# Patient Record
Sex: MALE | Race: WHITE | Employment: FULL TIME | URBAN - NONMETROPOLITAN AREA
[De-identification: names, ages, dates, MRNs, and addresses within clinical notes are randomized per-mention and may not be internally consistent; named-entity substitution may affect disease eponyms.]

---

## 2017-02-16 PROBLEM — K50.00 CROHN'S DISEASE OF SMALL INTESTINE (HCC): Status: ACTIVE | Noted: 2017-02-16

## 2017-02-16 PROBLEM — I49.3 PVC'S (PREMATURE VENTRICULAR CONTRACTIONS): Status: ACTIVE | Noted: 2017-02-16

## 2018-07-03 ENCOUNTER — HOSPITAL ENCOUNTER (EMERGENCY)
Age: 48
Discharge: HOME | End: 2018-07-03
Payer: COMMERCIAL

## 2018-07-03 VITALS
HEART RATE: 68 BPM | SYSTOLIC BLOOD PRESSURE: 118 MMHG | WEIGHT: 195 LBS | DIASTOLIC BLOOD PRESSURE: 58 MMHG | TEMPERATURE: 97.9 F | BODY MASS INDEX: 26.45 KG/M2 | RESPIRATION RATE: 16 BRPM | OXYGEN SATURATION: 97 %

## 2018-07-03 DIAGNOSIS — L25.5 ALLERGIC DERMATITIS DUE TO POISON VINE: Primary | ICD-10-CM

## 2018-07-03 PROCEDURE — 99213 OFFICE O/P EST LOW 20 MIN: CPT | Performed by: NURSE PRACTITIONER

## 2018-07-03 PROCEDURE — 96372 THER/PROPH/DIAG INJ SC/IM: CPT

## 2018-07-03 PROCEDURE — 99212 OFFICE O/P EST SF 10 MIN: CPT

## 2018-07-03 PROCEDURE — 6360000002 HC RX W HCPCS: Performed by: NURSE PRACTITIONER

## 2018-07-03 RX ORDER — METHYLPREDNISOLONE ACETATE 80 MG/ML
80 INJECTION, SUSPENSION INTRA-ARTICULAR; INTRALESIONAL; INTRAMUSCULAR; SOFT TISSUE ONCE
Status: COMPLETED | OUTPATIENT
Start: 2018-07-03 | End: 2018-07-03

## 2018-07-03 RX ORDER — PREDNISONE 10 MG/1
10 TABLET ORAL SEE ADMIN INSTRUCTIONS
Qty: 21 TABLET | Refills: 0 | Status: SHIPPED | OUTPATIENT
Start: 2018-07-03 | End: 2018-07-09

## 2018-07-03 RX ADMIN — METHYLPREDNISOLONE ACETATE 80 MG: 80 INJECTION, SUSPENSION INTRA-ARTICULAR; INTRALESIONAL; INTRAMUSCULAR; SOFT TISSUE at 11:08

## 2018-07-03 ASSESSMENT — ENCOUNTER SYMPTOMS
SHORTNESS OF BREATH: 0
PERI-ORBITAL EDEMA: 0
WHEEZING: 0
VOMITING: 0
COUGH: 0
RHINORRHEA: 0
NAUSEA: 0
SORE THROAT: 0
APNEA: 0
CHEST TIGHTNESS: 0
ABDOMINAL PAIN: 0
HOARSE VOICE: 0
STRIDOR: 0
CHOKING: 0
DIARRHEA: 0
THROAT SWELLING: 0

## 2018-09-07 ENCOUNTER — HOSPITAL ENCOUNTER (EMERGENCY)
Age: 48
Discharge: HOME | End: 2018-09-07
Attending: EMERGENCY MEDICINE
Payer: COMMERCIAL

## 2018-09-07 VITALS
RESPIRATION RATE: 16 BRPM | OXYGEN SATURATION: 96 % | DIASTOLIC BLOOD PRESSURE: 70 MMHG | TEMPERATURE: 97.8 F | WEIGHT: 200 LBS | HEART RATE: 64 BPM | BODY MASS INDEX: 27.12 KG/M2 | SYSTOLIC BLOOD PRESSURE: 114 MMHG

## 2018-09-07 DIAGNOSIS — J01.00 ACUTE MAXILLARY SINUSITIS, RECURRENCE NOT SPECIFIED: Primary | ICD-10-CM

## 2018-09-07 DIAGNOSIS — J20.9 ACUTE BRONCHITIS DUE TO INFECTION: ICD-10-CM

## 2018-09-07 PROCEDURE — 99212 OFFICE O/P EST SF 10 MIN: CPT

## 2018-09-07 PROCEDURE — 99213 OFFICE O/P EST LOW 20 MIN: CPT | Performed by: EMERGENCY MEDICINE

## 2018-09-07 RX ORDER — PROMETHAZINE HYDROCHLORIDE AND CODEINE PHOSPHATE 6.25; 1 MG/5ML; MG/5ML
5 SYRUP ORAL 4 TIMES DAILY PRN
Qty: 120 ML | Refills: 0 | Status: SHIPPED | OUTPATIENT
Start: 2018-09-07 | End: 2018-09-12

## 2018-09-07 RX ORDER — DOXYCYCLINE HYCLATE 100 MG
100 TABLET ORAL 2 TIMES DAILY
Qty: 14 TABLET | Refills: 0 | Status: SHIPPED | OUTPATIENT
Start: 2018-09-07 | End: 2018-09-14

## 2018-09-07 ASSESSMENT — ENCOUNTER SYMPTOMS
EYE DISCHARGE: 0
VOMITING: 0
SORE THROAT: 0
VOICE CHANGE: 1
STRIDOR: 0
SINUS PRESSURE: 1
WHEEZING: 0
EYE REDNESS: 0
TROUBLE SWALLOWING: 0
RHINORRHEA: 1
SINUS PAIN: 1
DIARRHEA: 0
SHORTNESS OF BREATH: 0
COUGH: 1
BACK PAIN: 0
NAUSEA: 0
ABDOMINAL PAIN: 0
EYE PAIN: 0

## 2018-09-07 NOTE — ED PROVIDER NOTES
past surgical history that includes Small intestine surgery. CURRENT MEDICATIONS       Previous Medications    INFLIXIMAB (REMICADE) 100 MG INJECTION    Infuse 5 mg/kg intravenously See Admin Instructions Every 6 weeks    METOPROLOL SUCCINATE (TOPROL XL) 25 MG EXTENDED RELEASE TABLET    Take 25 mg by mouth daily    NITROGLYCERIN (NITROSTAT) 0.4 MG SL TABLET    up to max of 3 total doses. If no relief after 1 dose, call 911. ALLERGIES     Patient is is allergic to shellfish-derived products. FAMILY HISTORY     Patient's family history includes No Known Problems in his mother. SOCIAL HISTORY     Patient  reports that he has never smoked. He has never used smokeless tobacco. He reports that he drinks alcohol. He reports that he does not use drugs. PHYSICAL EXAM     ED TRIAGE VITALS  BP: 114/70, Temp: 97.8 °F (36.6 °C), Pulse: 64, Resp: 16, SpO2: 96 %  Physical Exam   Constitutional: He is oriented to person, place, and time. He appears well-developed and well-nourished. No distress. Nasal voice, dry cough, moist membranes, normal airway   HENT:   Head: Normocephalic and atraumatic. Right Ear: Tympanic membrane and external ear normal.   Left Ear: Tympanic membrane and external ear normal.   Nose: No rhinorrhea. Right sinus exhibits maxillary sinus tenderness. Right sinus exhibits no frontal sinus tenderness. Left sinus exhibits maxillary sinus tenderness. Left sinus exhibits no frontal sinus tenderness. Mouth/Throat: Oropharynx is clear and moist. No trismus in the jaw. No uvula swelling. No oropharyngeal exudate, posterior oropharyngeal edema, posterior oropharyngeal erythema or tonsillar abscesses. Eyes: Pupils are equal, round, and reactive to light. Conjunctivae and EOM are normal. Right eye exhibits no discharge. Left eye exhibits no discharge. No scleral icterus. Neck: Normal range of motion. No JVD present. No thyromegaly present.    No meningismus   Cardiovascular: Normal rate, regular rhythm, S1 normal, S2 normal, normal heart sounds, intact distal pulses and normal pulses. Exam reveals no gallop and no friction rub. No murmur heard. Pulmonary/Chest: Effort normal. No stridor. No tachypnea. No respiratory distress. He has no decreased breath sounds. He has no wheezes. He has rhonchi. He has no rales. He exhibits no tenderness. Dry cough, diffuse rhonchi, no wheezing, no stridor   Abdominal: Soft. Bowel sounds are normal. He exhibits no distension and no mass. There is no tenderness. There is no rebound and no guarding. Soft nontender   Musculoskeletal: Normal range of motion. He exhibits no edema or tenderness. Right lower leg: Normal.        Left lower leg: Normal.   Lymphadenopathy:     He has cervical adenopathy. Right cervical: Superficial cervical adenopathy present. No deep cervical adenopathy present. Left cervical: Superficial cervical adenopathy present. No deep cervical adenopathy present. Neurological: He is alert and oriented to person, place, and time. He has normal reflexes. No cranial nerve deficit. He exhibits normal muscle tone. Coordination normal.   Appropriate, no focal findings   Skin: Skin is warm and dry. No rash noted. He is not diaphoretic. No erythema. No rash   Psychiatric: He has a normal mood and affect. His behavior is normal. Judgment and thought content normal.   Nursing note and vitals reviewed. DIAGNOSTIC RESULTS   Labs:No results found for this visit on 09/07/18. IMAGING:  No orders to display     URGENT CARE COURSE:     Vitals:    09/07/18 1902   BP: 114/70   Pulse: 64   Resp: 16   Temp: 97.8 °F (36.6 °C)   TempSrc: Oral   SpO2: 96%   Weight: 200 lb (90.7 kg)       Medications - No data to display  PROCEDURES:  None  FINAL IMPRESSION      1. Acute maxillary sinusitis, recurrence not specified    2.  Acute bronchitis due to infection        DISPOSITION/PLAN   DISPOSITION Decision To Discharge 09/07/2018 07:34:54 PM nontoxic, well-hydrated, normal airway. No airway abscess or epiglottitis, sepsis, CNS infection, pneumonia, hypoxia, bronchospasm. No ACS, CHF, PE. Patient has acute sinusitis and acute bronchitis. Will treat with doxycycline, Coricidin HBP.,  Phenergan with codeine, Tylenol, increased oral clear liquids, rest in cool air-conditioned space. Patient to recheck with PCP in 5 days if problems persist, and understands to go to ED if worse. PATIENT REFERRED TO:  Smith Anne MD  [address] [place] [address]    Schedule an appointment as soon as possible for a visit in 5 days  Recheck if problems persist, go to emergency if worse    DISCHARGE MEDICATIONS:  New Prescriptions    DEXTROMETHORPHAN-GUAIFENESIN (CORICIDIN HBP CONGESTION/COUGH)  MG CAPS    Take 1 tablet by mouth 4 times daily as needed (Sinus pressure, postnasal drainage, congestion, cough)    DOXYCYCLINE HYCLATE (VIBRA-TABS) 100 MG TABLET    Take 1 tablet by mouth 2 times daily for 7 days    PROMETHAZINE-CODEINE (PHENERGAN WITH CODEINE) 6.25-10 MG/5ML SYRUP    Take 5 mLs by mouth 4 times daily as needed for Cough (Multivitamins by mouth 4 times daily for cough) for up to 5 days. Caution not at work will cause drowsiness.      Current Discharge Medication List          MD Manuel Joaquin MD  09/07/18 7690

## 2021-10-14 NOTE — ED PROVIDER NOTES
Caller: MELITON         Best call back number: [phone]      What was the call regarding:[facility] CALLED AND STATED THEY DID RECEIVE THE REFERRAL FOR HOME HEALTH, BUT AT PATIENTS REQUEST THEY ARE STARTING SERVICES ON Monday 10/18        and headaches. PAST MEDICAL HISTORY         Diagnosis Date    Crohn's disease [facility])        SURGICAL HISTORY     Patient  has a past surgical history that includes Small intestine surgery. CURRENT MEDICATIONS       Previous Medications    INFLIXIMAB (REMICADE) 100 MG INJECTION    Infuse 5 mg/kg intravenously See Admin Instructions Every 6 weeks    METOPROLOL SUCCINATE (TOPROL XL) 25 MG EXTENDED RELEASE TABLET    Take 25 mg by mouth daily    NITROGLYCERIN (NITROSTAT) 0.4 MG SL TABLET    up to max of 3 total doses. If no relief after 1 dose, call 911.    TESTOSTERONE (AXIRON) 30 MG/ACT SOLN    Place  onto the skin. ALLERGIES     Patient is is allergic to shellfish-derived products. FAMILY HISTORY     Patient's family history is not on file. SOCIAL HISTORY     Patient  reports that he has never smoked. He has never used smokeless tobacco. He reports that he drinks alcohol. He reports that he does not use drugs. PHYSICAL EXAM     ED TRIAGE VITALS  BP: 123/87, Temp: 97.9 °F (36.6 °C), Pulse: 72, Resp: 16, SpO2: 97 %  Physical Exam   Constitutional: He is oriented to person, place, and time. He appears well-developed and well-nourished. No distress. HENT:   Head: Normocephalic and atraumatic. Eyes: Conjunctivae and EOM are normal.   Neck: Normal range of motion. Cardiovascular: Intact distal pulses. Pulmonary/Chest: Effort normal.   Musculoskeletal: Normal range of motion. Neurological: He is alert and oriented to person, place, and time. Skin: Skin is warm and dry. Rash noted. Rash is urticarial. He is not diaphoretic. No cyanosis. Nails show no clubbing. Psychiatric: He has a normal mood and affect. His behavior is normal. Judgment and thought content normal.   Nursing note and vitals reviewed. DIAGNOSTIC RESULTS   Labs:No results found for this visit on 07/03/18.     IMAGING:  No orders to display     URGENT CARE COURSE:     Vitals:    07/03/18 1048   BP: 123/87   Pulse: 72   Resp: 16   Temp: 97.9 °F (36.6 °C)   TempSrc: Oral   SpO2: 97%   Weight: 195 lb (88.5 kg)       Medications   methylPREDNISolone acetate (DEPO-MEDROL) injection 80 mg (80 mg Intramuscular Given 7/3/18 1107)     PROCEDURES:  None  FINAL IMPRESSION      1. Allergic dermatitis due to poison vine        DISPOSITION/PLAN   Decision To Discharge     Take Medication as Directed  Benadryl for itch, Don't scratch  Monitor for any increase in size or spreading  Monitor for fever or chills  Keep drainage covered if any. Follow up with your PCP or return as needed  Or go to the emergency Department. PATIENT REFERRED TO:  Brandi Emerson MD  [address] [place] [address]    Schedule an appointment as soon as possible for a visit in 1 week  For wound re-check    DISCHARGE MEDICATIONS:  New Prescriptions    HOMEOPATHIC PRODUCTS (TECNU RASH REL MED ANTI-ITCH) SOLN    Follow package directions for topical use    PREDNISONE (DELTASONE) 10 MG TABLET    Take 1 tablet by mouth See Admin Instructions for 6 days 60 mgs p.o x 1 day. 50 mgs p.o x 1 day. 40 mgs p.o x 1 day. 30 mgs p.o x 1 day. 20 mgs p.o x 1 day. 10 mgs p.o x 1 day.      Current Discharge Medication List          LUIS Salas - CNP          LUIS Salas - [facility]  07/03/18 7028

## 2025-04-17 ENCOUNTER — LAB (OUTPATIENT)
Dept: LAB | Age: 55
End: 2025-04-17

## 2025-04-17 LAB
25(OH)D3 SERPL-MCNC: 42 NG/ML (ref 30–100)
ALBUMIN SERPL BCG-MCNC: 4.2 G/DL (ref 3.4–4.9)
ALP SERPL-CCNC: 60 U/L (ref 40–129)
ALT SERPL W/O P-5'-P-CCNC: 23 U/L (ref 10–50)
ANION GAP SERPL CALC-SCNC: 10 MEQ/L (ref 8–16)
AST SERPL-CCNC: 24 U/L (ref 10–50)
BASOPHILS ABSOLUTE: 0 THOU/MM3 (ref 0–0.1)
BASOPHILS NFR BLD AUTO: 0.5 %
BILIRUB SERPL-MCNC: 0.9 MG/DL (ref 0.3–1.2)
BUN SERPL-MCNC: 18 MG/DL (ref 8–23)
CALCIUM SERPL-MCNC: 9.5 MG/DL (ref 8.6–10)
CHLORIDE SERPL-SCNC: 103 MEQ/L (ref 98–111)
CHOLEST SERPL-MCNC: 168 MG/DL (ref 100–199)
CO2 SERPL-SCNC: 24 MEQ/L (ref 22–29)
CREAT SERPL-MCNC: 1.1 MG/DL (ref 0.7–1.2)
DEPRECATED RDW RBC AUTO: 40.1 FL (ref 35–45)
EOSINOPHIL NFR BLD AUTO: 5.3 %
EOSINOPHILS ABSOLUTE: 0.4 THOU/MM3 (ref 0–0.4)
ERYTHROCYTE [DISTWIDTH] IN BLOOD BY AUTOMATED COUNT: 12.2 % (ref 11.5–14.5)
GFR SERPL CREATININE-BSD FRML MDRD: 79 ML/MIN/1.73M2
GLUCOSE SERPL-MCNC: 93 MG/DL (ref 74–109)
HCT VFR BLD AUTO: 43.6 % (ref 42–52)
HDLC SERPL-MCNC: 54 MG/DL
HGB BLD-MCNC: 14.9 GM/DL (ref 14–18)
IMM GRANULOCYTES # BLD AUTO: 0.01 THOU/MM3 (ref 0–0.07)
IMM GRANULOCYTES NFR BLD AUTO: 0.1 %
LDLC SERPL CALC-MCNC: 85 MG/DL
LYMPHOCYTES ABSOLUTE: 4.1 THOU/MM3 (ref 1–4.8)
LYMPHOCYTES NFR BLD AUTO: 52.3 %
MCH RBC QN AUTO: 30.8 PG (ref 26–33)
MCHC RBC AUTO-ENTMCNC: 34.2 GM/DL (ref 32.2–35.5)
MCV RBC AUTO: 90.3 FL (ref 80–94)
MONOCYTES ABSOLUTE: 0.5 THOU/MM3 (ref 0.4–1.3)
MONOCYTES NFR BLD AUTO: 6.1 %
NEUTROPHILS ABSOLUTE: 2.8 THOU/MM3 (ref 1.8–7.7)
NEUTROPHILS NFR BLD AUTO: 35.7 %
NRBC BLD AUTO-RTO: 0 /100 WBC
PLATELET # BLD AUTO: 348 THOU/MM3 (ref 130–400)
PMV BLD AUTO: 8.5 FL (ref 9.4–12.4)
POTASSIUM SERPL-SCNC: 4.5 MEQ/L (ref 3.5–5.2)
PROT SERPL-MCNC: 7.4 G/DL (ref 6.4–8.3)
PSA SERPL-MCNC: 0.73 NG/ML (ref 0–1)
RBC # BLD AUTO: 4.83 MILL/MM3 (ref 4.7–6.1)
SODIUM SERPL-SCNC: 137 MEQ/L (ref 135–145)
TRIGL SERPL-MCNC: 144 MG/DL (ref 0–199)
WBC # BLD AUTO: 7.8 THOU/MM3 (ref 4.8–10.8)

## 2025-04-18 LAB — TESTOST SERPL-MCNC: 509 NG/DL (ref 193–740)

## 2025-05-13 ENCOUNTER — HOSPITAL ENCOUNTER (OUTPATIENT)
Dept: INTERVENTIONAL RADIOLOGY/VASCULAR | Age: 55
Discharge: HOME | End: 2025-05-15

## 2025-05-13 DIAGNOSIS — Z13.6 ENCOUNTER FOR SCREENING FOR VASCULAR DISEASE: ICD-10-CM

## 2025-05-13 PROCEDURE — 9900000021 US VASCULAR SCREENING
